# Patient Record
Sex: MALE | Race: WHITE
[De-identification: names, ages, dates, MRNs, and addresses within clinical notes are randomized per-mention and may not be internally consistent; named-entity substitution may affect disease eponyms.]

---

## 2023-06-11 ENCOUNTER — HOSPITAL ENCOUNTER (EMERGENCY)
Age: 18
LOS: 1 days | Discharge: PSYCHIATRIC OR SUBSTANCE USE FACILITY | End: 2023-06-12
Payer: COMMERCIAL

## 2023-06-11 VITALS — BODY MASS INDEX: 25.3 KG/M2

## 2023-06-11 VITALS
SYSTOLIC BLOOD PRESSURE: 145 MMHG | RESPIRATION RATE: 16 BRPM | HEART RATE: 97 BPM | OXYGEN SATURATION: 98 % | DIASTOLIC BLOOD PRESSURE: 89 MMHG

## 2023-06-11 DIAGNOSIS — Z79.899: ICD-10-CM

## 2023-06-11 DIAGNOSIS — R45.851: Primary | ICD-10-CM

## 2023-06-11 DIAGNOSIS — F32.9: ICD-10-CM

## 2023-06-11 LAB
ACETAMINOPHEN: <10 UG/ML (ref 10–30)
ADD MANUAL DIFF: NO
ALANINE AMINOTRANSFERASE: 96 U/L (ref 16–63)
ALBUMIN GLOBULIN RATIO: 1.1
ALBUMIN LEVEL: 4.5 G/DL (ref 3.4–5)
ALKALINE PHOSPHATASE: 52 U/L (ref 46–116)
ASPARTATE AMINO TRANSFERASE: 59 U/L (ref 15–37)
BLOOD UREA NITROGEN: 10 MG/DL (ref 6.4–19.3)
CALCIUM: 9.2 MG/DL (ref 8.5–10.1)
CANNABINOID SCREEN URINE: NEGATIVE
CARBON DIOXIDE: 29.1 MMOL/L (ref 21–32)
CO2 BLD-SCNC: 29.1 MMOL/L (ref 21–32)
ESTIMATED GFR (AFRICAN AMERICA: >60 (ref 60–?)
ESTIMATED GFR (NON-AFRICAN AME: >60 (ref 60–?)
GLOBULIN: 4.2 G/DL
GLUCOSE BLD-MCNC: 82 MG/DL (ref 74–106)
HCT VFR BLD CALC: 45.1 % (ref 42–54)
HEMATOCRIT: 45.1 % (ref 42–54)
HEMOGLOBIN: 15.6 G/DL (ref 14–18)
IMMATURE GRANULOCYTES ABS AUTO: 0.02 10^3/UL (ref 0–0.03)
IMMATURE GRANULOCYTES PCT AUTO: 0.3 % (ref 0–0.5)
LYMPHOCYTES  ABSOLUTE AUTO: 2.9 10^3/UL (ref 1.2–3.8)
MCV RBC: 91.5 FL (ref 80–94)
MEAN CORPUSCULAR HEMOGLOBIN: 31.6 PG (ref 25.9–34)
MEAN CORPUSCULAR HGB CONC: 34.6 G/DL (ref 29.9–35.2)
MEAN CORPUSCULAR VOLUME: 91.5 FL (ref 80–94)
METHAMPHETAMINES SCREEN URINE: NEGATIVE
PLATELET # BLD: 340 10^3/UL (ref 150–450)
PLATELET COUNT: 340 10^3/UL (ref 150–450)
RED BLOOD COUNT: 4.93 10^6/UL (ref 4.7–6.1)
SALICYLATE: <2.8 MG/DL (ref ?–19.9)
TOTAL PROTEIN: 8.7 G/DL (ref 6.4–8.2)
TRICYCLIC ANTIDEPRESSANT URINE: NEGATIVE
WBC # BLD: 6.8 10^3/UL (ref 4–11)
WHITE BLOOD COUNT: 6.8 10^3/UL (ref 4–11)

## 2023-06-11 PROCEDURE — 80179 DRUG ASSAY SALICYLATE: CPT

## 2023-06-11 PROCEDURE — 80320 DRUG SCREEN QUANTALCOHOLS: CPT

## 2023-06-11 PROCEDURE — 80307 DRUG TEST PRSMV CHEM ANLYZR: CPT

## 2023-06-11 PROCEDURE — 36415 COLL VENOUS BLD VENIPUNCTURE: CPT

## 2023-06-11 PROCEDURE — 80053 COMPREHEN METABOLIC PANEL: CPT

## 2023-06-11 PROCEDURE — 85025 COMPLETE CBC W/AUTO DIFF WBC: CPT

## 2023-06-11 PROCEDURE — 93005 ELECTROCARDIOGRAM TRACING: CPT

## 2023-06-11 PROCEDURE — 99285 EMERGENCY DEPT VISIT HI MDM: CPT

## 2023-06-11 PROCEDURE — 80329 ANALGESICS NON-OPIOID 1 OR 2: CPT

## 2023-06-11 NOTE — ED_ITS
HPI - Anxiety    
General    
Chief Complaint: Anxiety    
Stated Complaint: ANXIETY    
Time Seen by Provider: 06/11/23 22:24    
Source: patient    
Mode of arrival: walk-in    
History of Present Illness    
HPI narrative:     
patient's coming to the Emergency Room for concern of suicidal thoughts he have   
history of depression taking Lexapro but he mentioned that over the last few   
days if not helping him, the patient does not have any clear plan at the moment   
, has has falt affect and denies any other complaints     
    
pt mentioned his medciation not helping     
    
Review of systems otherwise negative    
Related Data    
                                Home Medications    
    
    
    
 Medication  Instructions  Recorded  Confirmed    
     
escitalopram oxalate 20 mg tablet 20 mg PO DAILY 06/11/23 06/11/23    
    
(Lexapro)       
    
    
    
                                    Allergies    
    
    
    
Allergy/AdvReac Type Severity Reaction Status Date / Time    
     
No Known Drug Allergies Allergy   Verified 06/11/23 22:21    
    
    
    
    
Review of Systems    
    
    
ROS      
    
 Status of ROS 10 or more systems reviewed and unremarkable except as noted in   
history and below       
    
    
PFS    
PFS    
Social History    
Smoking status:  Current every day smoker     
    
    
    
Exam    
Narrative    
Exam Narrative:     
Nurses notes and vital signs reviewed and patient is not hypoxic.    
    
General:  Well-appearing and in no apparent distress.      
Skin:  Warm, dry, no pallor noted.      
No rash.    
Head:  Normocephalic, atraumatic.    
Neck:  Supple, non-tender.    
Eye:  Pupils are equal, round and EOMI. No scleral icterus.    
Ears, Nose, Mouth, and Throat:  TM are clear, no nasal mucosal hypertrophy.    
Oral mucosa is moist, no posterior oropharynx erythema, uvula is mid-line    
Cardiovascular:  Regular Rate and Rhythm without murmur, gallop or rub.    
Respiratory:  No accessory muscle use or respiratory distress.    
Lungs are clear to auscultation, no wheezing, rales or rhonchi    
Chest Wall:  no tenderness    
Back: No midline thoracic or lumbar vertebral tenderness. No CVA tenderness    
Musculoskeletal:  normal ROM, no calf or popliteal tenderness, no lower   
extremity edema/swelling    
GI:  Abdomen is soft, non-distended.  Normal bowel sounds.    
No masses appreciated.    
No tenderness to palpation. No rebound, guarding, or rigidity noted.    
Neurological:  A&O x4.  No cranial nerve dysfunction observed.  No truncal   
ataxia. Moves all extremities. Sensation intact.    
Psychiatric:  Cooperative and interactive. Normal mood and affect.    
Constitutional    
    
                               Vital Signs - 24 hr    
    
    
    
 06/11/23    
22:16    
     
Pulse Rate [Monitor] 97 H    
     
Respiratory Rate 16    
     
Blood Pressure [Right Arm] 145/89 H    
     
Pulse Oximetry 98    
     
Oxygen Delivery Method Room Air    
    
    
    
    
    
Course    
Vital Signs    
Vital signs:     
    
                                   Vital Signs    
    
    
    
Pulse Rate  97 H  06/11/23 22:16    
     
Respiratory Rate  16   06/11/23 22:16    
     
Blood Pressure  145/89 H  06/11/23 22:16    
     
Pulse Oximetry  98   06/11/23 22:16    
     
Oxygen Delivery Method  Room Air  06/11/23 22:16    
    
    
                                            
    
    
    
Pulse Rate  97 H  06/11/23 22:16    
     
Respiratory Rate  16   06/11/23 22:16    
     
Blood Pressure  145/89 H  06/11/23 22:16    
     
Pulse Oximetry  98   06/11/23 22:16    
     
Oxygen Delivery Method  Room Air  06/11/23 22:16    
    
    
    
    
    
MDM - Anxiety    
MDM Narrative    
Medical decision making narrative:     
EKG showing sinus rhythm no ST elevation or depression    
    
pt blood work up with no acute significant pathology and his alcohol level is   
above two hundred    
    
Right now the patient was evaluated by [facility] psych service and accepted by Dr Varner to be admitted for Major depression disorder     
    
pt has sitter at the bedside  and awaiting transport     
Lab Data    
Labs:     
    
                                   Lab Results    
    
    
    
  06/11/23 06/11/23 06/12/23 Range/Units    
    
  22:10 22:31 01:16     
     
WBC   6.8   (4.0-11.0)  10^3/uL    
     
RBC   4.93   (4.70-6.10)  10^6/uL    
     
Hgb   15.6   (14.0-18.0)  g/dL    
     
Hct   45.1   (42.0-54.0)  %    
     
MCV   91.5   (80.0-94.0)  fL    
     
MCH   31.6   (25.9-34.0)  pg    
     
MCHC   34.6   (29.9-35.2)  g/dL    
     
RDW   12.0   (11.0-15.0)  %    
     
Plt Count   340   (150-450)  10^3/uL    
     
MPV   9.6   (9.5-13.5)  fL    
     
Neut % (Auto)   43.3   (43.0-75.0)  %    
     
Lymph % (Auto)   42.4   (20.5-60.0)  %    
     
Mono % (Auto)   8.7   (1.7-12.0)  %    
     
Eos % (Auto)   4.6   (0.9-7.0)  %    
     
Baso % (Auto)   0.7   (0.2-2.0)  %    
     
Neut # (Auto)   3.0   (1.4-6.5)  10^3/uL    
     
Lymph # (Auto)   2.9   (1.2-3.8)  10^3/uL    
     
Mono # (Auto)   0.6   (0.3-0.8)  10^3/uL    
     
Eos # (Auto)   0.3   (0.0-0.7)  10^3/uL    
     
Baso # (Auto)   0.1   (0.0-0.1)  10^3/uL    
     
Abs Immat Gran (auto)   0.02   (0.00-0.03)  10^3/uL    
     
Imm/Tot Granulo (auto)   0.3   (0.0-0.5)  %    
     
Sodium   138   (136-145)  mmol/L    
     
Potassium   3.7   (3.5-5.1)  mmol/L    
     
Chloride   99   ()  mmol/L    
     
Carbon Dioxide   29.1   (21.0-32.0)  mmol/L    
     
Anion Gap   13.6       
     
BUN   10.0   (6.4-19.3)  mg/dL    
     
Creatinine   0.90   (0.70-1.30)  mg/dL    
     
Est GFR ( Amer)   >60   (>=60)      
     
Est GFR (Non-Af Amer)   >60   (>=60)      
     
BUN/Creatinine Ratio   11.1       
     
Glucose   82   ()  mg/dL    
     
Calcium   9.2   (8.5-10.1)  mg/dL    
     
Total Bilirubin   0.2   (0.2-1.0)  mg/dL    
     
AST   59 H   (15-37)  U/L    
     
ALT   96 H   (16-63)  U/L    
     
Alkaline Phosphatase   52   ()  U/L    
     
Total Protein   8.7 H   (6.4-8.2)  g/dL    
     
Albumin   4.5   (3.4-5.0)  g/dL    
     
Globulin   4.2   g/dL    
     
Albumin/Globulin Ratio   1.1       
     
Salicylates   <2.8   (<=19.9)  mg/dL    
     
Urine Opiates Screen  Negative    (NEGATIVE)      
     
Ur Buprenorphine Scrn  Negative    (NEGATIVE)      
     
Ur Oxycodone Screen  Negative    (NEGATIVE)      
     
Urine Methadone Screen  Negative    (NEGATIVE)      
     
Ur Propoxyphene Screen  Negative    (NEGATIVE)      
     
Acetaminophen   <10.0 L   (10.0-30.0)  ug/mL    
     
Ur Barbiturates Screen  Negative    (NEGATIVE)      
     
U Tricyclic Antidepress  Negative    (NEGATIVE)      
     
Ur Phencyclidine Scrn  Negative    (NEGATIVE)      
     
Ur Amphetamines Screen  Negative    (NEGATIVE)      
     
U Methamphetamines Scrn  Negative    (NEGATIVE)      
     
U Benzodiazepines Scrn  Negative    (NEGATIVE)      
     
Urine Cocaine Screen  Negative    (NEGATIVE)      
     
U Cannabinoids Screen  Negative    (NEGATIVE)      
     
Ethanol Quant   205  146  mg/dL    
    
    
    
    
  06/12/23 Range/Units    
    
  03:09     
     
WBC   (4.0-11.0)  10^3/uL    
     
RBC   (4.70-6.10)  10^6/uL    
     
Hgb   (14.0-18.0)  g/dL    
     
Hct   (42.0-54.0)  %    
     
MCV   (80.0-94.0)  fL    
     
MCH   (25.9-34.0)  pg    
     
MCHC   (29.9-35.2)  g/dL    
     
RDW   (11.0-15.0)  %    
     
Plt Count   (150-450)  10^3/uL    
     
MPV   (9.5-13.5)  fL    
     
Neut % (Auto)   (43.0-75.0)  %    
     
Lymph % (Auto)   (20.5-60.0)  %    
     
Mono % (Auto)   (1.7-12.0)  %    
     
Eos % (Auto)   (0.9-7.0)  %    
     
Baso % (Auto)   (0.2-2.0)  %    
     
Neut # (Auto)   (1.4-6.5)  10^3/uL    
     
Lymph # (Auto)   (1.2-3.8)  10^3/uL    
     
Mono # (Auto)   (0.3-0.8)  10^3/uL    
     
Eos # (Auto)   (0.0-0.7)  10^3/uL    
     
Baso # (Auto)   (0.0-0.1)  10^3/uL    
     
Abs Immat Gran (auto)   (0.00-0.03)  10^3/uL    
     
Imm/Tot Granulo (auto)   (0.0-0.5)  %    
     
Sodium   (136-145)  mmol/L    
     
Potassium   (3.5-5.1)  mmol/L    
     
Chloride   ()  mmol/L    
     
Carbon Dioxide   (21.0-32.0)  mmol/L    
     
Anion Gap       
     
BUN   (6.4-19.3)  mg/dL    
     
Creatinine   (0.70-1.30)  mg/dL    
     
Est GFR ( Amer)   (>=60)      
     
Est GFR (Non-Af Amer)   (>=60)      
     
BUN/Creatinine Ratio       
     
Glucose   ()  mg/dL    
     
Calcium   (8.5-10.1)  mg/dL    
     
Total Bilirubin   (0.2-1.0)  mg/dL    
     
AST   (15-37)  U/L    
     
ALT   (16-63)  U/L    
     
Alkaline Phosphatase   ()  U/L    
     
Total Protein   (6.4-8.2)  g/dL    
     
Albumin   (3.4-5.0)  g/dL    
     
Globulin   g/dL    
     
Albumin/Globulin Ratio       
     
Salicylates   (<=19.9)  mg/dL    
     
Urine Opiates Screen   (NEGATIVE)      
     
Ur Buprenorphine Scrn   (NEGATIVE)      
     
Ur Oxycodone Screen   (NEGATIVE)      
     
Urine Methadone Screen   (NEGATIVE)      
     
Ur Propoxyphene Screen   (NEGATIVE)      
     
Acetaminophen   (10.0-30.0)  ug/mL    
     
Ur Barbiturates Screen   (NEGATIVE)      
     
U Tricyclic Antidepress   (NEGATIVE)      
     
Ur Phencyclidine Scrn   (NEGATIVE)      
     
Ur Amphetamines Screen   (NEGATIVE)      
     
U Methamphetamines Scrn   (NEGATIVE)      
     
U Benzodiazepines Scrn   (NEGATIVE)      
     
Urine Cocaine Screen   (NEGATIVE)      
     
U Cannabinoids Screen   (NEGATIVE)      
     
Ethanol Quant  84  mg/dL    
    
    
    
    
    
Discharge Plan    
Discharge    
Chief Complaint: Anxiety    
    
Clinical Impression:    
 Major depressive disorder, Suicidal ideation    
    
    
Patient Disposition: [facility]    
    
Time of Disposition Decision: 02:39    
    
Discharge Location: [facility]    
    
Discharge location: Psychatric service     
    
Condition: Good    
    
Mode of Transportation: EMS

## 2023-06-11 NOTE — ED.ANXIETY1
HPI - Anxiety
General
Chief Complaint: Anxiety
Stated Complaint: ANXIETY
Time Seen by Provider: 06/11/23 22:24
Source: patient
Mode of arrival: walk-in
History of Present Illness
HPI narrative: 
patient's coming to the Emergency Room for concern of suicidal thoughts he have history of depression taking Lexapro but he mentioned that over the last few days if not helping him, the patient does not have any clear plan at the moment , has has 
falt affect and denies any other complaints 

pt mentioned his medciation not helping 

Review of systems otherwise negative
Related Data
Home Medications

 Medication  Instructions  Recorded  Confirmed
escitalopram oxalate 20 mg tablet 20 mg PO DAILY 06/11/23 06/11/23
(Lexapro)   


Allergies

Allergy/AdvReac Type Severity Reaction Status Date / Time
No Known Drug Allergies Allergy   Verified 06/11/23 22:21



Review of Systems
ROS  
 Status of ROS 10 or more systems reviewed and unremarkable except as noted in history and below   

PFS
PFS
Social History
Smoking status:  Current every day smoker 



Exam
Narrative
Exam Narrative: 
Nurses notes and vital signs reviewed and patient is not hypoxic.

General:  Well-appearing and in no apparent distress.  
Skin:  Warm, dry, no pallor noted.  
No rash.
Head:  Normocephalic, atraumatic.
Neck:  Supple, non-tender.
Eye:  Pupils are equal, round and EOMI. No scleral icterus.
Ears, Nose, Mouth, and Throat:  TM are clear, no nasal mucosal hypertrophy.  Oral mucosa is moist, no posterior oropharynx erythema, uvula is mid-line
Cardiovascular:  Regular Rate and Rhythm without murmur, gallop or rub.
Respiratory:  No accessory muscle use or respiratory distress.
Lungs are clear to auscultation, no wheezing, rales or rhonchi
Chest Wall:  no tenderness
Back: No midline thoracic or lumbar vertebral tenderness. No CVA tenderness
Musculoskeletal:  normal ROM, no calf or popliteal tenderness, no lower extremity edema/swelling
GI:  Abdomen is soft, non-distended.  Normal bowel sounds.
No masses appreciated.
No tenderness to palpation. No rebound, guarding, or rigidity noted.
Neurological:  A&O x4.  No cranial nerve dysfunction observed.  No truncal ataxia. Moves all extremities. Sensation intact.
Psychiatric:  Cooperative and interactive. Normal mood and affect.
Constitutional

Vital Signs - 24 hr

 06/11/23
22:16
Pulse Rate [Monitor] 97 H
Respiratory Rate 16
Blood Pressure [Right Arm] 145/89 H
Pulse Oximetry 98
Oxygen Delivery Method Room Air




Course
Vital Signs
Vital signs: 

Vital Signs

Pulse Rate  97 H  06/11/23 22:16
Respiratory Rate  16   06/11/23 22:16
Blood Pressure  145/89 H  06/11/23 22:16
Pulse Oximetry  98   06/11/23 22:16
Oxygen Delivery Method  Room Air  06/11/23 22:16



Pulse Rate  97 H  06/11/23 22:16
Respiratory Rate  16   06/11/23 22:16
Blood Pressure  145/89 H  06/11/23 22:16
Pulse Oximetry  98   06/11/23 22:16
Oxygen Delivery Method  Room Air  06/11/23 22:16




MDM - Anxiety
MDM Narrative
Medical decision making narrative: 
EKG showing sinus rhythm no ST elevation or depression

pt blood work up with no acute significant pathology and his alcohol level is above two hundred

Right now the patient was evaluated by [facility] psych service and accepted by Dr Varner to be admitted for Major depression disorder 

pt has sitter at the bedside  and awaiting transport 
Lab Data
Labs: 

Lab Results

  06/11/23 06/11/23 06/12/23 Range/Units
  22:10 22:31 01:16 
WBC   6.8   (4.0-11.0)  10^3/uL
RBC   4.93   (4.70-6.10)  10^6/uL
Hgb   15.6   (14.0-18.0)  g/dL
Hct   45.1   (42.0-54.0)  %
MCV   91.5   (80.0-94.0)  fL
MCH   31.6   (25.9-34.0)  pg
MCHC   34.6   (29.9-35.2)  g/dL
RDW   12.0   (11.0-15.0)  %
Plt Count   340   (150-450)  10^3/uL
MPV   9.6   (9.5-13.5)  fL
Neut % (Auto)   43.3   (43.0-75.0)  %
Lymph % (Auto)   42.4   (20.5-60.0)  %
Mono % (Auto)   8.7   (1.7-12.0)  %
Eos % (Auto)   4.6   (0.9-7.0)  %
Baso % (Auto)   0.7   (0.2-2.0)  %
Neut # (Auto)   3.0   (1.4-6.5)  10^3/uL
Lymph # (Auto)   2.9   (1.2-3.8)  10^3/uL
Mono # (Auto)   0.6   (0.3-0.8)  10^3/uL
Eos # (Auto)   0.3   (0.0-0.7)  10^3/uL
Baso # (Auto)   0.1   (0.0-0.1)  10^3/uL
Abs Immat Gran (auto)   0.02   (0.00-0.03)  10^3/uL
Imm/Tot Granulo (auto)   0.3   (0.0-0.5)  %
Sodium   138   (136-145)  mmol/L
Potassium   3.7   (3.5-5.1)  mmol/L
Chloride   99   ()  mmol/L
Carbon Dioxide   29.1   (21.0-32.0)  mmol/L
Anion Gap   13.6   
BUN   10.0   (6.4-19.3)  mg/dL
Creatinine   0.90   (0.70-1.30)  mg/dL
Est GFR ( Amer)   >60   (>=60)  
Est GFR (Non-Af Amer)   >60   (>=60)  
BUN/Creatinine Ratio   11.1   
Glucose   82   ()  mg/dL
Calcium   9.2   (8.5-10.1)  mg/dL
Total Bilirubin   0.2   (0.2-1.0)  mg/dL
AST   59 H   (15-37)  U/L
ALT   96 H   (16-63)  U/L
Alkaline Phosphatase   52   ()  U/L
Total Protein   8.7 H   (6.4-8.2)  g/dL
Albumin   4.5   (3.4-5.0)  g/dL
Globulin   4.2   g/dL
Albumin/Globulin Ratio   1.1   
Salicylates   <2.8   (<=19.9)  mg/dL
Urine Opiates Screen  Negative    (NEGATIVE)  
Ur Buprenorphine Scrn  Negative    (NEGATIVE)  
Ur Oxycodone Screen  Negative    (NEGATIVE)  
Urine Methadone Screen  Negative    (NEGATIVE)  
Ur Propoxyphene Screen  Negative    (NEGATIVE)  
Acetaminophen   <10.0 L   (10.0-30.0)  ug/mL
Ur Barbiturates Screen  Negative    (NEGATIVE)  
U Tricyclic Antidepress  Negative    (NEGATIVE)  
Ur Phencyclidine Scrn  Negative    (NEGATIVE)  
Ur Amphetamines Screen  Negative    (NEGATIVE)  
U Methamphetamines Scrn  Negative    (NEGATIVE)  
U Benzodiazepines Scrn  Negative    (NEGATIVE)  
Urine Cocaine Screen  Negative    (NEGATIVE)  
U Cannabinoids Screen  Negative    (NEGATIVE)  
Ethanol Quant   205  146  mg/dL

  06/12/23 Range/Units
  03:09 
WBC   (4.0-11.0)  10^3/uL
RBC   (4.70-6.10)  10^6/uL
Hgb   (14.0-18.0)  g/dL
Hct   (42.0-54.0)  %
MCV   (80.0-94.0)  fL
MCH   (25.9-34.0)  pg
MCHC   (29.9-35.2)  g/dL
RDW   (11.0-15.0)  %
Plt Count   (150-450)  10^3/uL
MPV   (9.5-13.5)  fL
Neut % (Auto)   (43.0-75.0)  %
Lymph % (Auto)   (20.5-60.0)  %
Mono % (Auto)   (1.7-12.0)  %
Eos % (Auto)   (0.9-7.0)  %
Baso % (Auto)   (0.2-2.0)  %
Neut # (Auto)   (1.4-6.5)  10^3/uL
Lymph # (Auto)   (1.2-3.8)  10^3/uL
Mono # (Auto)   (0.3-0.8)  10^3/uL
Eos # (Auto)   (0.0-0.7)  10^3/uL
Baso # (Auto)   (0.0-0.1)  10^3/uL
Abs Immat Gran (auto)   (0.00-0.03)  10^3/uL
Imm/Tot Granulo (auto)   (0.0-0.5)  %
Sodium   (136-145)  mmol/L
Potassium   (3.5-5.1)  mmol/L
Chloride   ()  mmol/L
Carbon Dioxide   (21.0-32.0)  mmol/L
Anion Gap   
BUN   (6.4-19.3)  mg/dL
Creatinine   (0.70-1.30)  mg/dL
Est GFR ( Amer)   (>=60)  
Est GFR (Non-Af Amer)   (>=60)  
BUN/Creatinine Ratio   
Glucose   ()  mg/dL
Calcium   (8.5-10.1)  mg/dL
Total Bilirubin   (0.2-1.0)  mg/dL
AST   (15-37)  U/L
ALT   (16-63)  U/L
Alkaline Phosphatase   ()  U/L
Total Protein   (6.4-8.2)  g/dL
Albumin   (3.4-5.0)  g/dL
Globulin   g/dL
Albumin/Globulin Ratio   
Salicylates   (<=19.9)  mg/dL
Urine Opiates Screen   (NEGATIVE)  
Ur Buprenorphine Scrn   (NEGATIVE)  
Ur Oxycodone Screen   (NEGATIVE)  
Urine Methadone Screen   (NEGATIVE)  
Ur Propoxyphene Screen   (NEGATIVE)  
Acetaminophen   (10.0-30.0)  ug/mL
Ur Barbiturates Screen   (NEGATIVE)  
U Tricyclic Antidepress   (NEGATIVE)  
Ur Phencyclidine Scrn   (NEGATIVE)  
Ur Amphetamines Screen   (NEGATIVE)  
U Methamphetamines Scrn   (NEGATIVE)  
U Benzodiazepines Scrn   (NEGATIVE)  
Urine Cocaine Screen   (NEGATIVE)  
U Cannabinoids Screen   (NEGATIVE)  
Ethanol Quant  84  mg/dL




Discharge Plan
Discharge
Chief Complaint: Anxiety

Clinical Impression:
 Major depressive disorder, Suicidal ideation


Patient Disposition: General acute hospital

Time of Disposition Decision: 02:39

Discharge Location: [facility]

Discharge location: Psychatric service 

Condition: Good

Mode of Transportation: EMS

## 2023-06-11 NOTE — ECG_ITS
[facility] 
                                        
                                       Test Date:    2023 
Pat Name:     JASON MCKNINEY          Department:    
Patient ID:   [id number]               Room:         - 
Gender:       Male                     Technician:    
:          [id number]               Requested By: ROSETTA MARTIN 
Order Number: [id number]              Reading MD:   ROSETTA MARTIN 
                                 Measurements 
Intervals                              Axis           
Rate:         83                       P:            70 
MT:           146                      QRS:          57 
QRSD:         98                       T:            58 
QT:           344                                     
QTc:          383                                     
                           Interpretive Statements 
1100 Sinus rhythm 
9110 **  normal ECG  ** 
No previous ECG available for comparison 
Electronically Signed On 2023 6:24:23 EDT by ROSETTA MARTIN

## 2023-06-12 VITALS
HEART RATE: 97 BPM | OXYGEN SATURATION: 98 % | DIASTOLIC BLOOD PRESSURE: 80 MMHG | SYSTOLIC BLOOD PRESSURE: 150 MMHG | TEMPERATURE: 97.88 F | RESPIRATION RATE: 16 BRPM

## 2023-06-12 LAB
ANION GAP: 13.6
CHLORIDE: 99 MMOL/L (ref 98–107)
POTASSIUM SERPLBLD-SCNC: 3.7 MMOL/L (ref 3.5–5.1)
POTASSIUM: 3.7 MMOL/L (ref 3.5–5.1)
SODIUM BLD-SCNC: 138 MMOL/L (ref 136–145)
SODIUM: 138 MMOL/L (ref 136–145)